# Patient Record
Sex: FEMALE | Race: WHITE | NOT HISPANIC OR LATINO | Employment: UNEMPLOYED | URBAN - METROPOLITAN AREA
[De-identification: names, ages, dates, MRNs, and addresses within clinical notes are randomized per-mention and may not be internally consistent; named-entity substitution may affect disease eponyms.]

---

## 2019-02-18 ENCOUNTER — OFFICE VISIT (OUTPATIENT)
Dept: MATERNAL FETAL MEDICINE | Facility: CLINIC | Age: 34
End: 2019-02-18
Payer: MEDICAID

## 2019-02-18 DIAGNOSIS — O28.1 ABNORMAL BIOCHEMICAL FINDING ON ANTENATAL SCREENING OF MOTHER: Primary | ICD-10-CM

## 2019-02-18 PROCEDURE — 99205 OFFICE O/P NEW HI 60 MIN: CPT | Mod: TH,25,S$GLB, | Performed by: OBSTETRICS & GYNECOLOGY

## 2019-02-18 PROCEDURE — 76811 OB US DETAILED SNGL FETUS: CPT | Mod: S$GLB,,, | Performed by: OBSTETRICS & GYNECOLOGY

## 2019-02-18 PROCEDURE — 76811 PR US, OB FETAL EVAL & EXAM, TRANSABDOM,FIRST GESTATION: ICD-10-PCS | Mod: S$GLB,,, | Performed by: OBSTETRICS & GYNECOLOGY

## 2019-02-18 PROCEDURE — 99205 PR OFFICE/OUTPT VISIT, NEW, LEVL V, 60-74 MIN: ICD-10-PCS | Mod: TH,25,S$GLB, | Performed by: OBSTETRICS & GYNECOLOGY

## 2019-04-08 DIAGNOSIS — O28.1 ABNORMAL BIOCHEMICAL FINDING ON ANTENATAL SCREENING OF MOTHER: Primary | ICD-10-CM

## 2019-04-15 ENCOUNTER — INITIAL CONSULT (OUTPATIENT)
Dept: MATERNAL FETAL MEDICINE | Facility: CLINIC | Age: 34
End: 2019-04-15
Payer: MEDICAID

## 2019-04-15 VITALS
RESPIRATION RATE: 22 BRPM | SYSTOLIC BLOOD PRESSURE: 118 MMHG | HEIGHT: 69 IN | BODY MASS INDEX: 24.59 KG/M2 | WEIGHT: 166 LBS | HEART RATE: 100 BPM | DIASTOLIC BLOOD PRESSURE: 86 MMHG

## 2019-04-15 DIAGNOSIS — O28.1 ABNORMAL BIOCHEMICAL FINDING ON ANTENATAL SCREENING OF MOTHER: ICD-10-CM

## 2019-04-15 DIAGNOSIS — O35.9XX0 KNOWN FETAL ANOMALY, ANTEPARTUM, SINGLE OR UNSPECIFIED FETUS: Primary | ICD-10-CM

## 2019-04-15 PROCEDURE — 76816 PR  US,PREGNANT UTERUS,F/U,TRANSABD APP: ICD-10-PCS | Mod: S$GLB,,, | Performed by: OBSTETRICS & GYNECOLOGY

## 2019-04-15 PROCEDURE — 99215 OFFICE O/P EST HI 40 MIN: CPT | Mod: TH,25,S$GLB, | Performed by: OBSTETRICS & GYNECOLOGY

## 2019-04-15 PROCEDURE — 99215 PR OFFICE/OUTPT VISIT, EST, LEVL V, 40-54 MIN: ICD-10-PCS | Mod: TH,25,S$GLB, | Performed by: OBSTETRICS & GYNECOLOGY

## 2019-04-15 PROCEDURE — 76816 OB US FOLLOW-UP PER FETUS: CPT | Mod: S$GLB,,, | Performed by: OBSTETRICS & GYNECOLOGY

## 2019-04-15 RX ORDER — LABETALOL 100 MG/1
100 TABLET, FILM COATED ORAL 2 TIMES DAILY
COMMUNITY
End: 2019-05-13 | Stop reason: SDUPTHER

## 2019-04-15 NOTE — PROGRESS NOTES
"Radha is here for followup [facility] consultation for fetal duodenal atresia - likely T-21 on NIPT, referred by Dr. Brody. She was scheduled for appt in [facility] on 3/13/19 but did not go due to no transportation.  Radha also states that she is "not doing my sugar test because they can't ever get my vein"    She denies any headaches, visual or epigastric complaints.    She is feeling fetal movement.    Radha denies vaginal bleeding, loss of fluid, recurrent contractions.    Vitals:    04/15/19 1143   BP: 118/86   Pulse: 100   Resp: (!) 22             "

## 2019-04-15 NOTE — PROGRESS NOTES
Indication for follow up consultation:  Fetal duodenal atresia with positive maternal free fetal DNA for trisomy 21  Chronic hypertension    Provider requesting consultation:  Dr. Brody    Dear Dr. Brody    I had the pleasure of seeing Radha Davis for follow up consultation today.  As you recall she is a 34 y.o.  at 30w2d here for follow up consultation regarding fetal duodenal atresia with positive maternal free fetal DNA for trisomy 21 and chronic hypertension.    In regards to fetal duodenal atresia the patient was scheduled to see us in Maternal Fetal Medicine at our offices in [place] last month yet did not make her appointment secondary to transportation problems. She and her mother desire to have this visit with our  intensive care unit, pediatric surgeon and pediatric cardiologist rescheduled in the near future.    In regards to hypertension the patient is well-controlled taking labetalol 100 mg b.i.d..    Review of systems: The patient denies any vaginal bleeding, loss of fluid or contraction pain today.  Vitals:    04/15/19 1143   BP: 118/86   Pulse: 100   Resp: (!) 22       Physical exam:  Gen: WDWN in NAD  HEENT: WNL  Abdomen: Soft, non-tender  Skin: No rash or jaundice  Extremities: No clubbing or cyanosis  Neuro: Grossly intact    Ultrasound:  A repeat detailed fetal anatomical survey was completed once again today.  There is a single intrauterine pregnancy in the cephalic presentation.  The estimated fetal weight is 1130 grams which is the 13th percentile for this gestational age. The fetus was noted to have a classic finding of duodenal atresia as noted by a double bubble sign.  There is also on today's ultrasound the finding of mild bilateral fetal cerebral ventriculomegaly.  The amniotic fluid volume appears to be normal. The placenta does not show any evidence of previa.  Please see our official report for further specifics.    Recommendations:  Today I discussed with  the patient once again the above ultrasound findings.  I reviewed with her once again my suspicion is that this fetus has Down syndrome.  I outlined with her a plan for delivery in [place] for  surgery.  I discussed with her a plan for induction of labor at 37 weeks secondary to her chronic hypertension.  Between now and delivery I would suggest in your office is to plan for fetal well-being testing beginning at 32 weeks.  I would suggest either once weekly biophysical profiles or twice weekly nonstress testing secondary to her chronic hypertension.    I have given her my office phone number in [place] so that she and her mother can set an appointment date that works for their transportation issues.  I would like for her to come meet our  intensive care unit team, our pediatric surgeon, and possibly undergo a fetal echocardiogram if time allows.    One other thing to note.  The patient desires to have a postpartum tubal ligation.  We will sign consent forms with her at her visit in [place] in 3-4 weeks.    Thanks once again for allowing us to participate in the care of this patient.  I look forward to seeing her in our [place] office in 3-4 weeks.  If you find that she is not able to keep this appointment and please alert me or my office so we can reiterate the importance of follow-up consultation.    Thanks once again for allowing us to participate in the care of this very nice patient.    Sincerely,

## 2019-04-15 NOTE — LETTER
April 15, 2019        Sabra Brody MD  [address]  [facility] [address]             [place] - Maternal Fetal Medicine  [address]  [place] [address]  Phone: [phone]  Fax: [phone]   Patient: Radha Davis   MR Number: [id number]   YOB: 1985   Date of Visit: 4/15/2019       Dear Dr. Brody:    Thank you for referring Radha Davis to me for evaluation. Attached you will find relevant portions of my assessment and plan of care.    If you have questions, please do not hesitate to call me. I look forward to following Radha Davis along with you.    Sincerely,      Pradeep Del Castillo MD          CC  No Recipients    Enclosure

## 2019-05-09 NOTE — PROGRESS NOTES
Patient here for Maternal Fetal Medicine consultation today.  See my dictated note from today located under the Media tab for complete details.

## 2019-05-13 ENCOUNTER — TELEPHONE (OUTPATIENT)
Dept: MATERNAL FETAL MEDICINE | Facility: CLINIC | Age: 34
End: 2019-05-13

## 2019-05-13 ENCOUNTER — INITIAL CONSULT (OUTPATIENT)
Dept: MATERNAL FETAL MEDICINE | Facility: CLINIC | Age: 34
End: 2019-05-13
Payer: MEDICAID

## 2019-05-13 VITALS
SYSTOLIC BLOOD PRESSURE: 118 MMHG | HEART RATE: 100 BPM | DIASTOLIC BLOOD PRESSURE: 86 MMHG | BODY MASS INDEX: 24.59 KG/M2 | WEIGHT: 166 LBS | RESPIRATION RATE: 22 BRPM | HEIGHT: 69 IN

## 2019-05-13 DIAGNOSIS — O35.9XX0 KNOWN FETAL ANOMALY, ANTEPARTUM, SINGLE OR UNSPECIFIED FETUS: ICD-10-CM

## 2019-05-13 DIAGNOSIS — O35.9XX0 KNOWN FETAL ANOMALY, ANTEPARTUM, SINGLE OR UNSPECIFIED FETUS: Primary | ICD-10-CM

## 2019-05-13 PROCEDURE — 76816 PR  US,PREGNANT UTERUS,F/U,TRANSABD APP: ICD-10-PCS | Mod: S$GLB,,, | Performed by: OBSTETRICS & GYNECOLOGY

## 2019-05-13 PROCEDURE — 76816 OB US FOLLOW-UP PER FETUS: CPT | Mod: S$GLB,,, | Performed by: OBSTETRICS & GYNECOLOGY

## 2019-05-13 PROCEDURE — 99214 OFFICE O/P EST MOD 30 MIN: CPT | Mod: TH,25,S$GLB, | Performed by: OBSTETRICS & GYNECOLOGY

## 2019-05-13 PROCEDURE — 99214 PR OFFICE/OUTPT VISIT, EST, LEVL IV, 30-39 MIN: ICD-10-PCS | Mod: TH,25,S$GLB, | Performed by: OBSTETRICS & GYNECOLOGY

## 2019-05-13 RX ORDER — ALBUTEROL SULFATE 90 UG/1
AEROSOL, METERED RESPIRATORY (INHALATION)
Refills: 1 | COMMUNITY
Start: 2019-05-10

## 2019-05-13 RX ORDER — PANTOPRAZOLE SODIUM 40 MG/1
TABLET, DELAYED RELEASE ORAL
Refills: 1 | COMMUNITY
Start: 2019-05-10

## 2019-05-13 RX ORDER — LABETALOL 200 MG/1
200 TABLET, FILM COATED ORAL 3 TIMES DAILY
Refills: 0 | COMMUNITY
Start: 2019-05-05

## 2019-05-13 RX ORDER — NIFEDIPINE 60 MG/1
TABLET, EXTENDED RELEASE ORAL
Refills: 1 | COMMUNITY
Start: 2019-05-10

## 2019-05-13 NOTE — LETTER
May 13, 2019        Sabra Brody MD  [address]  [facility] [address]             [place] - Maternal Fetal Medicine  [address]  [place] [address]  Phone: [phone]  Fax: [phone]   Patient: Radha Davis   MR Number: [id number]   YOB: 1985   Date of Visit: 5/13/2019       Dear Dr. Brody:    Thank you for referring Radha Davis to me for evaluation. Attached you will find relevant portions of my assessment and plan of care.    If you have questions, please do not hesitate to call me. I look forward to following Radha Davis along with you.    Sincerely,      Jalil Salas MD          CC  No Recipients    Enclosure

## 2019-05-13 NOTE — PROGRESS NOTES
Maternal-Fetal medicine consultation    Reason for consultation:    Pregnancy at 34 weeks and 2 days  History of methamphetamine abuse and tobacco abuse since 15 years of age  Anxiety  Pregnancy complicated by duodenal atresia and probable trisomy 21  Chronic hypertension    Dear Dr. Brody,    Today, I had the opportunity to see your patient, Radha Davis, at [facility].  The patient is known to our service secondary to a positive cell free DNA test for trisomy 21 and also sonographic evidence of duodenal atresia.  Recently, the patient has had multiple visits to the hospital in [place] for contractions.  Multiple cervical examinations have not shown any cervical change.  The patient continues to smoke 1 pack of cigarettes per day.  She states she is clean with regards to drug use.  The patient's mother is a nurse.  She was very helpful and given a good history.  She reports that Vistaril was per prosed as a tool to help the patient's anxiety but is currently unavailable in her community.    Today, the patient was complaining of intense pain in our waiting area.  I asked my nurse to bring her to the exam room. I agree the patient was writhing back and forth on the table.  I palpated her uterus did not feel any contractions.  The patient allowed me to do a cervical exam.  The lower uterine segment is ballooned out secondary to polyhydramnios.  The cervix was easy to palpate posteriorly.  It was noted to be soft.  Approximately 50% effaced.  It was closed by my exam.  Other examiners may have culture 1 cm.  Most importantly, it did not appear that the patient has an labor.  At this point, I began to discuss possible treatment options.  The patient's mother was asking multiple questions.  While this was going on, the patient became quiet and no longer was writhing in pain. The patient did not appear to be in any distress at all.    I explained that her baby still  had evidence of duodenal atresia.  I explained that it is growth was normal. However, there is polyhydramnios.  I stated that she was not in labor.  I stated that I, nor anyone else in my group, would deliver her prematurely at 34 weeks given the current set of circumstances.  Therapeutic amniocentesis could be considered, however we had typically do not do these until there is maternal respiratory embarrassment.  Patient is certainly breathing normally today.  The problem with therapeutic amniocentesis is that the fluid typically re-accumulate very quickly.    I informed the patient that I felt much of what was going on with her was anxiety.  The more anxious she is the more she will contract.  I offered the patient a prescription for Ativan.  I feel this may be able to help her cope more adequately.  Certainly, if the patient goes in labor either into [place] are [place], we will take her immediately in transport by ambulance.  I informed the patient and her mother that we cannot House her at [facility] in [place] without a good reason we do not have one right now.    Post counseling the patient was much improved.  She had no complaints of contractions.  She accepted a prescription for Ativan.  Prescription was for 20 tablets at 2 mg each.  She was instructed to take medicine twice daily.  The patient has an appointment with me in [place] next Wednesday.  Her mother is prepared to transport her there.  Patient will see both myself and the pediatric cardiologist.    Delivery at 37 weeks is advised.  Next week we will get the patient scheduled for delivery when she comes to [place].  In the interim, if she goes into true labor with cervical change that we would be happy to transport about [place] at that time.  Please call us in [place] if you have any questions.  We are available 24 hr a day at [phone].    The majority of time spent with this patient today was on counseling  coordination of care.  Approximate physician face-to-face time with the patient her mother was 30 min.    Sincerely, Jalil Hylton MD

## 2019-05-13 NOTE — TELEPHONE ENCOUNTER
Per Dr. Hylton's request and patient's agreement, today's information sent to his office, MFM at [facility] in [place], to set up a followup MFM appointment and pediatric cardiology consult in [place] on May 22.

## 2019-05-13 NOTE — LETTER
May 13, 2019        Jalil Hylton MD  [address]  [facility] - Maternal Fetal Medicine  [address] [facility] [address]  Phone: [phone]  Fax: [phone]   Patient: Radha Davis   MR Number: [id number]   YOB: 1985   Date of Visit: 5/13/2019       Dear Dr. Jalil Hylton:    Attached please find the consultation information from Radha's MFM appointment at [facility] on 5/13/19. She has a followup appointment at your [place] office on May 22, 2019.    Sincerely,      Jessica Sanchez RN            CC  No Recipients    Enclosure

## 2019-05-13 NOTE — PROGRESS NOTES
"Radha is here for followup [facility] consultation for hypertension in pregnancy, referred by Dr. Brody.    The patient is currently taking Labetalol 200mg PO TID for blood pressure control.  She is not taking a daily low dose aspirin currently.    She admits to smoking 1 PPD cigarettes, states that the "Juul wasn't cutting it."    She is feeling fetal movement.    Radha denies vaginal bleeding, loss of fluid; she is complaining of contractions about every 2-3 minutes that started this AM again; has been having them for several days and was treated in hospital with Brethine, hydration, and swtiched from Procardia 10mg PO Q6H to Procardia XL 60mg daily for  contractions.    The patient denies headache, visual changes or right upper quadrant pain.    Vitals:    19 1024   BP: 118/86   Pulse: 100   Resp: (!) 22                         "

## 2021-07-01 ENCOUNTER — PATIENT MESSAGE (OUTPATIENT)
Dept: ADMINISTRATIVE | Facility: OTHER | Age: 36
End: 2021-07-01

## 2021-12-06 ENCOUNTER — PATIENT MESSAGE (OUTPATIENT)
Dept: RESEARCH | Facility: HOSPITAL | Age: 36
End: 2021-12-06
Payer: MEDICAID